# Patient Record
Sex: FEMALE
[De-identification: names, ages, dates, MRNs, and addresses within clinical notes are randomized per-mention and may not be internally consistent; named-entity substitution may affect disease eponyms.]

---

## 2019-12-26 ENCOUNTER — HOSPITAL ENCOUNTER (OUTPATIENT)
Dept: HOSPITAL 95 - LAB SHORT | Age: 44
Discharge: HOME | End: 2019-12-26
Attending: EMERGENCY MEDICINE
Payer: SELF-PAY

## 2019-12-26 DIAGNOSIS — R10.9: Primary | ICD-10-CM

## 2019-12-26 LAB
ALBUMIN SERPL BCP-MCNC: 3.5 G/DL (ref 3.4–5)
ALBUMIN/GLOB SERPL: 0.8 {RATIO} (ref 0.8–1.8)
ALT SERPL W P-5'-P-CCNC: 145 U/L (ref 12–78)
ANION GAP SERPL CALCULATED.4IONS-SCNC: 9 MMOL/L (ref 6–16)
AST SERPL W P-5'-P-CCNC: 296 U/L (ref 12–37)
BASOPHILS # BLD AUTO: 0.14 K/MM3 (ref 0–0.23)
BASOPHILS NFR BLD AUTO: 2 % (ref 0–2)
BILIRUB SERPL-MCNC: 0.7 MG/DL (ref 0.1–1)
BUN SERPL-MCNC: 13 MG/DL (ref 8–24)
CALCIUM SERPL-MCNC: 8.4 MG/DL (ref 8.5–10.1)
CHLORIDE SERPL-SCNC: 98 MMOL/L (ref 98–108)
CO2 SERPL-SCNC: 24 MMOL/L (ref 21–32)
CREAT SERPL-MCNC: 0.73 MG/DL (ref 0.4–1)
DEPRECATED RDW RBC AUTO: 44.4 FL (ref 35.1–46.3)
EOSINOPHIL # BLD AUTO: 0.06 K/MM3 (ref 0–0.68)
EOSINOPHIL NFR BLD AUTO: 1 % (ref 0–6)
ERYTHROCYTE [DISTWIDTH] IN BLOOD BY AUTOMATED COUNT: 17.8 % (ref 11.7–14.2)
GLOBULIN SER CALC-MCNC: 4.5 G/DL (ref 2.2–4)
GLUCOSE SERPL-MCNC: 122 MG/DL (ref 70–99)
HCT VFR BLD AUTO: 36.3 % (ref 33–51)
HGB BLD-MCNC: 11 G/DL (ref 11.5–16)
IMM GRANULOCYTES # BLD AUTO: 0.03 K/MM3 (ref 0–0.1)
IMM GRANULOCYTES NFR BLD AUTO: 0 % (ref 0–1)
LYMPHOCYTES # BLD AUTO: 2.78 K/MM3 (ref 0.84–5.2)
LYMPHOCYTES NFR BLD AUTO: 34 % (ref 21–46)
MCHC RBC AUTO-ENTMCNC: 30.3 G/DL (ref 31.5–36.5)
MCV RBC AUTO: 72 FL (ref 80–100)
MONOCYTES # BLD AUTO: 0.57 K/MM3 (ref 0.16–1.47)
MONOCYTES NFR BLD AUTO: 7 % (ref 4–13)
NEUTROPHILS # BLD AUTO: 4.61 K/MM3 (ref 1.96–9.15)
NEUTROPHILS NFR BLD AUTO: 56 % (ref 41–73)
NRBC # BLD AUTO: 0 K/MM3 (ref 0–0.02)
NRBC BLD AUTO-RTO: 0 /100 WBC (ref 0–0.2)
PLATELET # BLD AUTO: 344 K/MM3 (ref 150–400)
POTASSIUM SERPL-SCNC: 3.4 MMOL/L (ref 3.5–5.5)
PROT SERPL-MCNC: 8 G/DL (ref 6.4–8.2)
SODIUM SERPL-SCNC: 131 MMOL/L (ref 136–145)

## 2025-02-06 NOTE — NUR
02/06/25 1126 MIRANDA COSME
PT ATE "A FEW MINI SALTINE CRACKERS @ [id number]" DR MALIK AND DR LAWSON AND OR NURSE AWARE

## 2025-02-06 NOTE — NUR
02/06/25 1248 Stone,Rosita
JUDGE CATHETER REMOVED END OF CASE 300ML CLEAR YELLOW URINE, PT
TOLERATED WELL.

## 2025-02-06 NOTE — NUR
02/06/25 1404 Flora Antoine
PT STATED THAT HER PAIN LEVEL INCREASES TO A 8-9/10 WHEN SHE COUGHS.
PT RECEIVED 5/325MG OF PERCOCET VIA PO. PT ABLE TO TOLERATE FLUIDS AND
SOLIDS, EATING SNACK. NO C/O NAUSEA. PT UP TO THE RECLINER.